# Patient Record
Sex: FEMALE | Race: BLACK OR AFRICAN AMERICAN | Employment: FULL TIME | URBAN - METROPOLITAN AREA
[De-identification: names, ages, dates, MRNs, and addresses within clinical notes are randomized per-mention and may not be internally consistent; named-entity substitution may affect disease eponyms.]

---

## 2024-12-05 ENCOUNTER — HOSPITAL ENCOUNTER (EMERGENCY)
Facility: HOSPITAL | Age: 49
Discharge: HOME | End: 2024-12-05
Attending: EMERGENCY MEDICINE

## 2024-12-05 ENCOUNTER — CLINICAL SUPPORT (OUTPATIENT)
Dept: EMERGENCY MEDICINE | Facility: HOSPITAL | Age: 49
End: 2024-12-05

## 2024-12-05 ENCOUNTER — APPOINTMENT (OUTPATIENT)
Dept: RADIOLOGY | Facility: HOSPITAL | Age: 49
End: 2024-12-05

## 2024-12-05 VITALS
TEMPERATURE: 96.8 F | DIASTOLIC BLOOD PRESSURE: 81 MMHG | SYSTOLIC BLOOD PRESSURE: 134 MMHG | BODY MASS INDEX: 35.26 KG/M2 | HEIGHT: 58 IN | OXYGEN SATURATION: 100 % | WEIGHT: 168 LBS | RESPIRATION RATE: 16 BRPM | HEART RATE: 88 BPM

## 2024-12-05 DIAGNOSIS — R07.9 CHEST PAIN, UNSPECIFIED TYPE: Primary | ICD-10-CM

## 2024-12-05 DIAGNOSIS — H04.123 DRY EYES: ICD-10-CM

## 2024-12-05 LAB
ALBUMIN SERPL BCP-MCNC: 4 G/DL (ref 3.4–5)
ALP SERPL-CCNC: 86 U/L (ref 33–110)
ALT SERPL W P-5'-P-CCNC: 13 U/L (ref 7–45)
ANION GAP SERPL CALC-SCNC: 12 MMOL/L (ref 10–20)
AST SERPL W P-5'-P-CCNC: 15 U/L (ref 9–39)
ATRIAL RATE: 86 BPM
BASOPHILS # BLD AUTO: 0.03 X10*3/UL (ref 0–0.1)
BASOPHILS NFR BLD AUTO: 0.5 %
BILIRUB SERPL-MCNC: 0.3 MG/DL (ref 0–1.2)
BNP SERPL-MCNC: 11 PG/ML (ref 0–99)
BUN SERPL-MCNC: 12 MG/DL (ref 6–23)
CALCIUM SERPL-MCNC: 9.7 MG/DL (ref 8.6–10.6)
CARDIAC TROPONIN I PNL SERPL HS: <3 NG/L (ref 0–34)
CARDIAC TROPONIN I PNL SERPL HS: <3 NG/L (ref 0–34)
CHLORIDE SERPL-SCNC: 104 MMOL/L (ref 98–107)
CO2 SERPL-SCNC: 29 MMOL/L (ref 21–32)
CREAT SERPL-MCNC: 0.62 MG/DL (ref 0.5–1.05)
EGFRCR SERPLBLD CKD-EPI 2021: >90 ML/MIN/1.73M*2
EOSINOPHIL # BLD AUTO: 0.16 X10*3/UL (ref 0–0.7)
EOSINOPHIL NFR BLD AUTO: 2.8 %
ERYTHROCYTE [DISTWIDTH] IN BLOOD BY AUTOMATED COUNT: 13.2 % (ref 11.5–14.5)
GLUCOSE SERPL-MCNC: 85 MG/DL (ref 74–99)
HCT VFR BLD AUTO: 38.5 % (ref 36–46)
HGB BLD-MCNC: 12.6 G/DL (ref 12–16)
HOLD SPECIMEN: NORMAL
IMM GRANULOCYTES # BLD AUTO: 0.01 X10*3/UL (ref 0–0.7)
IMM GRANULOCYTES NFR BLD AUTO: 0.2 % (ref 0–0.9)
LYMPHOCYTES # BLD AUTO: 1.65 X10*3/UL (ref 1.2–4.8)
LYMPHOCYTES NFR BLD AUTO: 28.5 %
MCH RBC QN AUTO: 28.3 PG (ref 26–34)
MCHC RBC AUTO-ENTMCNC: 32.7 G/DL (ref 32–36)
MCV RBC AUTO: 86 FL (ref 80–100)
MONOCYTES # BLD AUTO: 0.53 X10*3/UL (ref 0.1–1)
MONOCYTES NFR BLD AUTO: 9.2 %
NEUTROPHILS # BLD AUTO: 3.41 X10*3/UL (ref 1.2–7.7)
NEUTROPHILS NFR BLD AUTO: 58.8 %
NRBC BLD-RTO: 0 /100 WBCS (ref 0–0)
P AXIS: 63 DEGREES
P OFFSET: 190 MS
P ONSET: 145 MS
PLATELET # BLD AUTO: 215 X10*3/UL (ref 150–450)
POTASSIUM SERPL-SCNC: 4.5 MMOL/L (ref 3.5–5.3)
PR INTERVAL: 166 MS
PROT SERPL-MCNC: 7.1 G/DL (ref 6.4–8.2)
Q ONSET: 228 MS
QRS COUNT: 14 BEATS
QRS DURATION: 72 MS
QT INTERVAL: 384 MS
QTC CALCULATION(BAZETT): 459 MS
QTC FREDERICIA: 433 MS
R AXIS: 27 DEGREES
RBC # BLD AUTO: 4.46 X10*6/UL (ref 4–5.2)
SODIUM SERPL-SCNC: 140 MMOL/L (ref 136–145)
T AXIS: 41 DEGREES
T OFFSET: 420 MS
VENTRICULAR RATE: 86 BPM
WBC # BLD AUTO: 5.8 X10*3/UL (ref 4.4–11.3)

## 2024-12-05 PROCEDURE — 80053 COMPREHEN METABOLIC PANEL: CPT | Performed by: EMERGENCY MEDICINE

## 2024-12-05 PROCEDURE — 85025 COMPLETE CBC W/AUTO DIFF WBC: CPT | Performed by: EMERGENCY MEDICINE

## 2024-12-05 PROCEDURE — 85025 COMPLETE CBC W/AUTO DIFF WBC: CPT | Performed by: STUDENT IN AN ORGANIZED HEALTH CARE EDUCATION/TRAINING PROGRAM

## 2024-12-05 PROCEDURE — 99285 EMERGENCY DEPT VISIT HI MDM: CPT | Performed by: EMERGENCY MEDICINE

## 2024-12-05 PROCEDURE — 71046 X-RAY EXAM CHEST 2 VIEWS: CPT | Mod: FOREIGN READ | Performed by: RADIOLOGY

## 2024-12-05 PROCEDURE — 84484 ASSAY OF TROPONIN QUANT: CPT | Performed by: PHYSICIAN ASSISTANT

## 2024-12-05 PROCEDURE — 2500000001 HC RX 250 WO HCPCS SELF ADMINISTERED DRUGS (ALT 637 FOR MEDICARE OP): Performed by: EMERGENCY MEDICINE

## 2024-12-05 PROCEDURE — 84484 ASSAY OF TROPONIN QUANT: CPT | Performed by: STUDENT IN AN ORGANIZED HEALTH CARE EDUCATION/TRAINING PROGRAM

## 2024-12-05 PROCEDURE — 83880 ASSAY OF NATRIURETIC PEPTIDE: CPT | Performed by: PHYSICIAN ASSISTANT

## 2024-12-05 PROCEDURE — 99285 EMERGENCY DEPT VISIT HI MDM: CPT | Mod: 25 | Performed by: EMERGENCY MEDICINE

## 2024-12-05 PROCEDURE — 93005 ELECTROCARDIOGRAM TRACING: CPT

## 2024-12-05 PROCEDURE — 84484 ASSAY OF TROPONIN QUANT: CPT | Performed by: EMERGENCY MEDICINE

## 2024-12-05 PROCEDURE — 36415 COLL VENOUS BLD VENIPUNCTURE: CPT | Performed by: PHYSICIAN ASSISTANT

## 2024-12-05 PROCEDURE — 36415 COLL VENOUS BLD VENIPUNCTURE: CPT | Performed by: STUDENT IN AN ORGANIZED HEALTH CARE EDUCATION/TRAINING PROGRAM

## 2024-12-05 PROCEDURE — 80053 COMPREHEN METABOLIC PANEL: CPT | Performed by: STUDENT IN AN ORGANIZED HEALTH CARE EDUCATION/TRAINING PROGRAM

## 2024-12-05 PROCEDURE — 71046 X-RAY EXAM CHEST 2 VIEWS: CPT

## 2024-12-05 RX ORDER — ACETAMINOPHEN 325 MG/1
650 TABLET ORAL ONCE
Status: COMPLETED | OUTPATIENT
Start: 2024-12-05 | End: 2024-12-05

## 2024-12-05 RX ADMIN — ACETAMINOPHEN 650 MG: 325 TABLET ORAL at 19:20

## 2024-12-05 ASSESSMENT — PAIN SCALES - GENERAL
PAINLEVEL_OUTOF10: 7
PAINLEVEL_OUTOF10: 2
PAINLEVEL_OUTOF10: 8

## 2024-12-05 ASSESSMENT — PAIN - FUNCTIONAL ASSESSMENT: PAIN_FUNCTIONAL_ASSESSMENT: 0-10

## 2024-12-05 ASSESSMENT — COLUMBIA-SUICIDE SEVERITY RATING SCALE - C-SSRS
2. HAVE YOU ACTUALLY HAD ANY THOUGHTS OF KILLING YOURSELF?: NO
6. HAVE YOU EVER DONE ANYTHING, STARTED TO DO ANYTHING, OR PREPARED TO DO ANYTHING TO END YOUR LIFE?: NO
1. IN THE PAST MONTH, HAVE YOU WISHED YOU WERE DEAD OR WISHED YOU COULD GO TO SLEEP AND NOT WAKE UP?: NO

## 2024-12-05 NOTE — DISCHARGE INSTRUCTIONS
You were evaluated in the Emergency Department today for chest pain. Your work-up today did not reveal any acute, emergent findings requiring intervention that would be a cause of your symptoms. We do recommend strongly that you follow up with your primary care provider as soon as you are able to for further evaluation as outpatient.     If you have a return or worsening of symptoms including worsening chest pain, shortness of breath, lightheadedness, dizziness, sweating, chest pain at rest, worsening exercise tolerance, or any new or concerning symptoms please seek immediate medical attention or come back to the ER.      Please call [phone] to schedule a follow-up with the Eye doctors.

## 2024-12-05 NOTE — Clinical Note
Marixa Gomez was seen and treated in our emergency department on 12/5/2024.  She may return to work on 12/06/2024.       If you have any questions or concerns, please don't hesitate to call.      Catarino Prado, DO

## 2024-12-05 NOTE — ED TRIAGE NOTES
Pt brought to ED from work with a c/o chest pain that radiates to her back and right jaw. Pt given ASA 324mg en route, Pt denies any PMH

## 2024-12-05 NOTE — ED PROVIDER NOTES
EMERGENCY DEPARTMENT ENCOUNTER      Pt Name: Marixa Gomez  MRN: [id number]  Birthdate 1975  Date of evaluation: 12/5/2024  Provider: Catarino Prado DO    CHIEF COMPLAINT       Chief Complaint   Patient presents with    Chest Pain         HISTORY OF PRESENT ILLNESS    Patient is a 49-year-old female presenting today with a chief complaint of chest pain. Patient reports that her symptoms starting this morning with her pain localized to the right side of her chest. No associated shortness of breath. No headache, dizziness, vision changes, numbness, or tingling. No pain or swelling to her arms or legs. She reports that she had some back pain earlier that spontaneously resolved. She denies any back pain currently. No history of heart disease.  No history of hypertension, diabetes, hyperlipidemia, or diabetes.  She does smoke 6 to 7 cigarettes/day.  She denies any family history of CAD or blood clots. No trauma, falls, or injuries.  No fevers or chills recently.  No abdominal pain.  She reports that she felt a little nauseous earlier but not now. She has not had any episodes of emesis.  No dysuria, hematuria, melena, or hematochezia.          Nursing Notes were reviewed.    PAST MEDICAL HISTORY   No past medical history on file.      SURGICAL HISTORY     No past surgical history on file.      CURRENT MEDICATIONS       Previous Medications    No medications on file       ALLERGIES     Patient has no known allergies.    FAMILY HISTORY     No family history on file.       SOCIAL HISTORY       Social History     Socioeconomic History    Marital status: Single     Social Drivers of Health     Food Insecurity: Unknown (6/3/2024)    Received from [facility] Vital Sign     Worried About Running Out of Food in the Last Year: Never true       SCREENINGS                        PHYSICAL EXAM    (up to 7 for level 4, 8 or more for level 5)     ED Triage Vitals [12/05/24 1447]   Temperature Heart Rate  Respirations BP   36.2 °C (97.1 °F) 87 17 131/83      Pulse Ox Temp Source Heart Rate Source Patient Position   99 % Temporal Monitor Sitting      BP Location FiO2 (%)     Left arm --       Physical Exam  Vitals and nursing note reviewed.   Constitutional:       General: She is not in acute distress.     Appearance: Normal appearance. She is not ill-appearing or toxic-appearing.   HENT:      Head: Normocephalic and atraumatic.      Right Ear: External ear normal.      Left Ear: External ear normal.      Nose: Nose normal.   Eyes:      General:         Right eye: No discharge.         Left eye: No discharge.      Extraocular Movements: Extraocular movements intact.      Conjunctiva/sclera: Conjunctivae normal.      Pupils: Pupils are equal, round, and reactive to light.      Comments: No conjunctival injection or erythema. No discharge. No scleral icterus.   Neck:      Vascular: No JVD.   Cardiovascular:      Rate and Rhythm: Normal rate and regular rhythm.      Pulses: Normal pulses.      Heart sounds: Normal heart sounds.   Pulmonary:      Effort: Pulmonary effort is normal. No respiratory distress.      Breath sounds: Normal breath sounds. No decreased breath sounds or wheezing.   Abdominal:      General: Bowel sounds are normal. There is no distension.      Palpations: Abdomen is soft. There is no mass.      Tenderness: There is no abdominal tenderness. There is no guarding or rebound.   Musculoskeletal:         General: No deformity or signs of injury. Normal range of motion.      Cervical back: Normal range of motion and neck supple.      Right lower leg: No edema.      Left lower leg: No edema.      Comments: Back: no midline tenderness, no step-offs or deformities.   Neurovascularly intact throughout. Pulses are intact. Moves all extremities with good tone and full ROM intact throughout. Calves soft and nontender. Some chest wall tenderness.   Skin:     General: Skin is warm and dry.      Findings: No rash.    Neurological:      General: No focal deficit present.      Mental Status: She is alert and oriented to person, place, and time. Mental status is at baseline.      Cranial Nerves: No cranial nerve deficit.      Motor: No weakness.   Psychiatric:         Mood and Affect: Mood normal.         Behavior: Behavior normal.          DIAGNOSTIC RESULTS     LABS:  Labs Reviewed   TROPONIN I, HIGH SENSITIVITY - Normal       Result Value    Troponin I, High Sensitivity ([facility]) <3      Narrative:     Less than 99th percentile of normal range cutoff-  Female and children under 18 years old <35 ng/L; Male <54 ng/L: Negative  Repeat testing should be performed if clinically indicated.     Female and children under 18 years old  ng/L; Male  ng/L:  Consistent with possible cardiac damage and possible increased clinical   risk. Serial measurements may help to assess extent of myocardial damage.     >120 ng/L: Consistent with cardiac damage, increased clinical risk and  myocardial infarction. Serial measurements may help assess extent of   myocardial damage.      NOTE: Children less than 1 year old may have higher baseline troponin   levels and results should be interpreted in conjunction with the overall   clinical context.    NOTE: Troponin I testing is performed using a different   testing methodology at [facility] than at other   [facility]. Direct result comparisons should only   be made within the same method.     COMPREHENSIVE METABOLIC PANEL - Normal    Glucose 85      Sodium 140      Potassium 4.5      Chloride 104      Bicarbonate 29      Anion Gap 12      Urea Nitrogen 12      Creatinine 0.62      eGFR >90      Calcium 9.7      Albumin 4.0      Alkaline Phosphatase 86      Total Protein 7.1      AST 15      Bilirubin, Total 0.3      ALT 13     TROPONIN I, HIGH SENSITIVITY - Normal    Troponin I, High Sensitivity ([facility]) <3      Narrative:     Less than 99th percentile of normal range  cutoff-  Female and children under 18 years old <35 ng/L; Male <54 ng/L: Negative  Repeat testing should be performed if clinically indicated.     Female and children under 18 years old  ng/L; Male  ng/L:  Consistent with possible cardiac damage and possible increased clinical   risk. Serial measurements may help to assess extent of myocardial damage.     >120 ng/L: Consistent with cardiac damage, increased clinical risk and  myocardial infarction. Serial measurements may help assess extent of   myocardial damage.      NOTE: Children less than 1 year old may have higher baseline troponin   levels and results should be interpreted in conjunction with the overall   clinical context.    NOTE: Troponin I testing is performed using a different   testing methodology at [facility] than at other   [facility]. Direct result comparisons should only   be made within the same method.     B-TYPE NATRIURETIC PEPTIDE - Normal    BNP 11      Narrative:        <100 pg/mL - Heart failure unlikely  100-299 pg/mL - Intermediate probability of acute heart                  failure exacerbation. Correlate with clinical                  context and patient history.    >=300 pg/mL - Heart Failure likely. Correlate with clinical                  context and patient history.     Biotin interference may cause falsely decreased results. Patients taking a Biotin dose of up to 5 mg/day should refrain from taking Biotin for 24 hours before sample  collection. Providers may contact their local laboratory for further information.   CBC WITH AUTO DIFFERENTIAL    WBC 5.8      nRBC 0.0      RBC 4.46      Hemoglobin 12.6      Hematocrit 38.5      MCV 86      MCH 28.3      MCHC 32.7      RDW 13.2      Platelets 215      Neutrophils % 58.8      Immature Granulocytes %, Automated 0.2      Lymphocytes % 28.5      Monocytes % 9.2      Eosinophils % 2.8      Basophils % 0.5      Neutrophils Absolute 3.41      Immature  Granulocytes Absolute, Automated 0.01      Lymphocytes Absolute 1.65      Monocytes Absolute 0.53      Eosinophils Absolute 0.16      Basophils Absolute 0.03         All other labs were within normal range or not returned as of this dictation.    Imaging  XR chest 2 views   Final Result   No acute process.   Signed by Francis Green MD           Procedures  Please see separate procedure documentation for further details.     EMERGENCY DEPARTMENT COURSE/MDM:   Medical Decision Making  49-year-old female presents to the ED with chief complaint of chest pain.  Per history it does not sound typical for angina in nature.  Pain is localized to the right side of her chest that started today, not associated with exertion. No shortness of breath or diaphoresis. No specific aggravating or alleviating factors. In the Emergency Department, hospital records were reviewed and IV access was obtained.  The patient is afebrile with stable vital signs. The patient is in no respiratory distress, satting well on room air. She is neurologically and neurovascularly intact. Patient is seen resting comfortably. She has some chest wall tenderness on exam. Differential is broad and includes but is not limited to ACS, pneumonia, costochondritis, muscle strain, musculoskeletal pain. Low concern for aortic dissection given lack of significant risk factors. Patient has intact pulses. We will proceed with chest pain work-up including EKG, chest x-ray, CBC, CMP, serial troponin, BNP to rule out acute coronary syndrome.  We are able to PERC her out in regard to evaluation for PE as she is not on any hormonal therapy, no personal or family history of DVT PE, no recent travel. No tachycardia or hypoxia.     Troponin was negative at <3 and repeat delta tropon remained negative at <3. CBC and CMP were unremarkable. BNP was normal.     Patient has low HEART score of 2. The patient was informed of the results. The patient felt comfortable being discharged  home. The patient was instructed of supportive measures and to follow-up with a primary care physician. She also reported having some dry eyes intermittently for which she was written a script for artificial tears. There is no evidence of any conjunctivitis or infection. Patient denies any eye pain, blurry vision, or double vision. She was also given the contact information to follow-up with Ophthalmology outpatient. Return precautions were provided, for which the patient expressed understanding. The patient was discharged home in stable condition. They should feel free to return to the Emergency Department at any time should their condition worsen or should they have any questions or concerns.         Please see ED course for additional MDM.    ED Course as of 12/05/24 1919   Thu Dec 05, 2024   1859 EKG was without ischemic changes.  Lab work was unremarkable with normal/adequately downtrending troponin.  Chest x-ray without evidence of pneumothorax, pneumonia, pneumomediastinum, or other abnormalities that would require admission.  Very low concern for ACS at this time as a cause of the patient's symptoms.  Admission was considered, but not indicated given a heart score less than 4 and low clinical suspicion for ACS.  Patient was given information for follow-up with their primary care provider.  They were in agreement with plan for discharge home.  Return precautions were discussed. [CL]      ED Course User Index  [CL] Catarino Prado,          Diagnoses as of 12/05/24 1919   Chest pain, unspecified type   Dry eyes        XR chest 2 views   Final Result   No acute process.   Signed by Francis Green MD          Patient and or family in agreement and understanding of treatment plan.  All questions answered.      I reviewed the case with the attending ED physician. The attending ED physician agrees with the plan. Patient and/or patient´s representative was counseled regarding labs, imaging, likely diagnosis, and plan.  All questions were answered.    ED Medications administered this visit:  Medications - No data to display    New Prescriptions from this visit:    New Prescriptions    No medications on file       Follow-up:  [facility]  [address]  [facility] [address] [phone]  Schedule an appointment as soon as possible for a visit           Final Impression:   1. Chest pain, unspecified type          (Please note that portions of this note were completed with a voice recognition program.  Efforts were made to edit the dictations but occasionally words are mis-transcribed.)     Catarino Prado DO  Resident  12/05/24 1900    I saw and evaluated the patient. I personally obtained the key and critical portions of the history and physical exam or was physically present for key and critical portions performed by the resident/fellow. I reviewed the resident/fellow's documentation and discussed the patient with the resident/fellow. I agree with the resident/fellow's medical decision making as documented in the note.    MD Eve Lamb MD  12/05/24 7437